# Patient Record
Sex: MALE | Race: WHITE | Employment: FULL TIME | URBAN - METROPOLITAN AREA
[De-identification: names, ages, dates, MRNs, and addresses within clinical notes are randomized per-mention and may not be internally consistent; named-entity substitution may affect disease eponyms.]

---

## 2018-09-19 ENCOUNTER — OFFICE VISIT (OUTPATIENT)
Dept: FAMILY MEDICINE | Facility: CLINIC | Age: 33
End: 2018-09-19
Payer: COMMERCIAL

## 2018-09-19 VITALS
TEMPERATURE: 98 F | SYSTOLIC BLOOD PRESSURE: 134 MMHG | HEIGHT: 68 IN | BODY MASS INDEX: 24.1 KG/M2 | HEART RATE: 77 BPM | WEIGHT: 159 LBS | OXYGEN SATURATION: 97 % | RESPIRATION RATE: 16 BRPM | DIASTOLIC BLOOD PRESSURE: 88 MMHG

## 2018-09-19 DIAGNOSIS — F41.1 GAD (GENERALIZED ANXIETY DISORDER): Primary | ICD-10-CM

## 2018-09-19 PROCEDURE — 99214 OFFICE O/P EST MOD 30 MIN: CPT | Performed by: NURSE PRACTITIONER

## 2018-09-19 ASSESSMENT — ENCOUNTER SYMPTOMS: NERVOUS/ANXIOUS: 1

## 2018-09-19 NOTE — PATIENT INSTRUCTIONS
Anxiety:  Start the zoloft/sertraline as prescribed.  Read the side effect profile that comes with the medication and let me know if you have any questions/problems.  Let me know if you have issues.  Otherwise follow up with me in 3 months (before your prescription runs out).  Consider therapy.      Understanding Anxiety Disorders  Almost everyone gets nervous now and then. It s normal to have knots in your stomach before a test, or for your heart to race on a first date. But an anxiety disorder is much more than a case of nerves. In fact, its symptoms may be overwhelming. But treatment can relieve many of these symptoms. Talking to your healthcare provider is the first step.    What are anxiety disorders?  An anxiety disorder causes intense feelings of panic and fear. These feelings may arise for no apparent reason. And they tend to recur again and again. They may prevent you from coping with life and cause you great distress. As a result, you may avoid anything that triggers your fear. In extreme cases, you may never leave the house. Anxiety disorders may cause other symptoms, such as:    Obsessive thoughts you can t control    Constant nightmares or painful thoughts of the past    Nausea, sweating, and muscle tension    Trouble sleeping or concentrating  What causes anxiety disorders?  Anxiety disorders tend to run in families. For some people, childhood abuse or neglect may play a role. For others, stressful life events or trauma may trigger anxiety disorders. Anxiety can trigger low self-esteem and poor coping skills.  Common anxiety disorders    Panic disorder. This causes an intense fear of being in danger.    Phobias. These are extreme fears of certain objects, places, or events.    Obsessive-compulsive disorder. This causes you to have unwanted thoughts and urges. You also may perform certain actions over and over.    Posttraumatic stress disorder. This occurs in people who have survived a terrible ordeal.  It can cause nightmares and flashbacks about the event.    Generalized anxiety disorder. This causes constant worry that can greatly disrupt your life.   Getting better  You may believe that nothing can help you. Or, you might fear what others may think. But most anxiety symptoms can be eased. Having an anxiety disorder is nothing to be ashamed of. Most people do best with treatment that combines medicine and therapy. These aren t cures. But they can help you live a healthier life.  Date Last Reviewed: 2/1/2017 2000-2017 [employer]. [address] [place] [address]. All rights reserved. This information is not intended as a substitute for professional medical care. Always follow your healthcare professional's instructions.

## 2018-09-19 NOTE — PROGRESS NOTES
"  SUBJECTIVE:   Ra Isabel is a 32 year old male who presents to clinic today for the following health issues:  Chief Complaint   Patient presents with     Anxiety         Anxiety     History of Present Illness     Depression & Anxiety Follow-up:     Depression/Anxiety:  Anxiety only    Status since last visit::  Worsened    Other associated symptoms of anxiety::  YES    Significant life event::  No    Current substance use::  Alcohol    Depression symptoms::  None       Today's PHQ-9         PHQ-9 Total Score:         PHQ-9 Q9 Suicidal ideation:       Thoughts of suicide or self harm:      Self-harm Plan:        Self-harm Action:          Safety concerns for self or others:            Ra reports \"I have always had anxiety and I have always had it pretty well controlled.\"  In 2007 he did take some diazepam intermittently for anxiety but he has not taken it since.  Since that time he has continued to have anxiety but he has done relaxation techniques, calming therapies, etc.  Mom has anxiety (uncertain if she is treated).  He is in the clinic today because his anxiety symptoms are escalating.  He has had some situations at home with his living situation where he and his wife live on the first level of his home.  A war  was living in the upper level of the home.  The warts that are known off of his psychiatric meds, \"went crazy\",   and divided homeless people to live in the home as well as on the front porch, and he has had some work stress.  Since that time, the war  has moved out.  He and his wife continue to have problems with street people on their front steps and home.  He is in the clinic today because his anxiety symptoms are getting worse.  He is not sleeping well.  He does not go to therapy, he does not think he needs it.    He is considering a medication today.  He is also considering medicinal marijuana.      Insomnia.  Getting worse now.    Working full time.   \"[employer].\"  Barista, customer service    From [facility]      Problem list and histories reviewed & adjusted, as indicated.  Additional history: as documented      Patient Active Problem List   Diagnosis     Snoring     LAVINIA (generalized anxiety disorder)     Past Surgical History:   Procedure Laterality Date     NO HISTORY OF SURGERY         Social History   Substance Use Topics     Smoking status: Former Smoker     Smokeless tobacco: Never Used     Alcohol use Yes      Comment: 1-2 times a week      Family History   Problem Relation Age of Onset     No Known Problems Mother      No Known Problems Father          Current Outpatient Prescriptions   Medication Sig Dispense Refill     sertraline (ZOLOFT) 50 MG tablet Take 1/2 tablet (25 mg) for 1-2 weeks, then increase to 1 tablet orally daily 90 tablet 0     Allergies   Allergen Reactions     Azithromycin      Recent Labs   Lab Test  04/13/16   1325   LDL  91   HDL  34*   TRIG  112      BP Readings from Last 3 Encounters:   09/19/18 134/88   04/13/16 124/80    Wt Readings from Last 3 Encounters:   09/19/18 159 lb (72.1 kg)   04/13/16 153 lb 12.8 oz (69.8 kg)                  Labs reviewed in EPIC    ROS:  Constitutional, HEENT, cardiovascular, pulmonary, GI, , musculoskeletal, neuro, skin, endocrine and psych systems are negative, except as otherwise noted.    OBJECTIVE:     /88  Pulse 77  Temp 98  F (36.7  C) (Oral)  Resp 16  Ht 5' 7.5\" (1.715 m)  Wt 159 lb (72.1 kg)  SpO2 97%  BMI 24.54 kg/m2  Body mass index is 24.54 kg/(m^2).  GENERAL APPEARANCE: healthy, alert and no distress. Smiling.   SKIN: warm and dry  PSYCH: mentation appears normal and affect normal/bright.  Good eye contact.  He appears to be in good spirits, offering information freely, smiling etc.    ASSESSMENT/PLAN:     (F41.1) LAVINIA (generalized anxiety disorder)  (primary encounter diagnosis)  Comment: Chronic/worse  Plan: sertraline (ZOLOFT) 50 MG tablet          Reviewed different " anxiety medications, their indications and reasons for use. Will start at a low dose of zoloft and anticipate that we may need to titrate up. Instructed pt to return to the clinic in3 month for a med recheck/refills, sooner if having issues or concerns.   I did tell him today that I would not prescribe benzodiazepines for him.  Since his anxiety is chronic and getting worse, he would do much better on a daily medication that will bring down his anxiety rather than take medication intermittently that would cause him to be sedated.  He agrees and understands.  I also told him that I do not prescribe and do not have the ability to prescribe medicinal marijuana.  He possibly will search had a provider who will do that or he will look into his options for medicinal marijuana.  He was appreciative the information.    Patient Instructions     Anxiety:  Start the zoloft/sertraline as prescribed.  Read the side effect profile that comes with the medication and let me know if you have any questions/problems.  Let me know if you have issues.  Otherwise follow up with me in 3 months (before your prescription runs out).  Consider therapy.      Understanding Anxiety Disorders  Almost everyone gets nervous now and then. It s normal to have knots in your stomach before a test, or for your heart to race on a first date. But an anxiety disorder is much more than a case of nerves. In fact, its symptoms may be overwhelming. But treatment can relieve many of these symptoms. Talking to your healthcare provider is the first step.    What are anxiety disorders?  An anxiety disorder causes intense feelings of panic and fear. These feelings may arise for no apparent reason. And they tend to recur again and again. They may prevent you from coping with life and cause you great distress. As a result, you may avoid anything that triggers your fear. In extreme cases, you may never leave the house. Anxiety disorders may cause other symptoms, such  as:    Obsessive thoughts you can t control    Constant nightmares or painful thoughts of the past    Nausea, sweating, and muscle tension    Trouble sleeping or concentrating  What causes anxiety disorders?  Anxiety disorders tend to run in families. For some people, childhood abuse or neglect may play a role. For others, stressful life events or trauma may trigger anxiety disorders. Anxiety can trigger low self-esteem and poor coping skills.  Common anxiety disorders    Panic disorder. This causes an intense fear of being in danger.    Phobias. These are extreme fears of certain objects, places, or events.    Obsessive-compulsive disorder. This causes you to have unwanted thoughts and urges. You also may perform certain actions over and over.    Posttraumatic stress disorder. This occurs in people who have survived a terrible ordeal. It can cause nightmares and flashbacks about the event.    Generalized anxiety disorder. This causes constant worry that can greatly disrupt your life.   Getting better  You may believe that nothing can help you. Or, you might fear what others may think. But most anxiety symptoms can be eased. Having an anxiety disorder is nothing to be ashamed of. Most people do best with treatment that combines medicine and therapy. These aren t cures. But they can help you live a healthier life.  Date Last Reviewed: 2/1/2017 2000-2017 [employer]. [address] [place] [address], [place] [address]. All rights reserved. This information is not intended as a substitute for professional medical care. Always follow your healthcare professional's instructions.            I spent a total of 30 min with the patient, >50% time spent face to face counseling regarding the above issues, establishing a plan of care, and coordinating follow up care.       ROSSY Chua [facility]

## 2018-09-19 NOTE — MR AVS SNAPSHOT
After Visit Summary   9/19/2018    Ra Isabel    MRN: [id number]           Patient Information     Date Of Birth          1985        Visit Information        Provider Department      9/19/2018 4:20 PM Tricia Mauricio APRN [facility]        Today's Diagnoses     LAVINIA (generalized anxiety disorder)    -  1      Care Instructions    Anxiety:  Start the zoloft/sertraline as prescribed.  Read the side effect profile that comes with the medication and let me know if you have any questions/problems.  Let me know if you have issues.  Otherwise follow up with me in 3 months (before your prescription runs out).  Consider therapy.      Understanding Anxiety Disorders  Almost everyone gets nervous now and then. It s normal to have knots in your stomach before a test, or for your heart to race on a first date. But an anxiety disorder is much more than a case of nerves. In fact, its symptoms may be overwhelming. But treatment can relieve many of these symptoms. Talking to your healthcare provider is the first step.    What are anxiety disorders?  An anxiety disorder causes intense feelings of panic and fear. These feelings may arise for no apparent reason. And they tend to recur again and again. They may prevent you from coping with life and cause you great distress. As a result, you may avoid anything that triggers your fear. In extreme cases, you may never leave the house. Anxiety disorders may cause other symptoms, such as:    Obsessive thoughts you can t control    Constant nightmares or painful thoughts of the past    Nausea, sweating, and muscle tension    Trouble sleeping or concentrating  What causes anxiety disorders?  Anxiety disorders tend to run in families. For some people, childhood abuse or neglect may play a role. For others, stressful life events or trauma may trigger anxiety disorders. Anxiety can trigger low self-esteem and poor coping skills.  Common  anxiety disorders    Panic disorder. This causes an intense fear of being in danger.    Phobias. These are extreme fears of certain objects, places, or events.    Obsessive-compulsive disorder. This causes you to have unwanted thoughts and urges. You also may perform certain actions over and over.    Posttraumatic stress disorder. This occurs in people who have survived a terrible ordeal. It can cause nightmares and flashbacks about the event.    Generalized anxiety disorder. This causes constant worry that can greatly disrupt your life.   Getting better  You may believe that nothing can help you. Or, you might fear what others may think. But most anxiety symptoms can be eased. Having an anxiety disorder is nothing to be ashamed of. Most people do best with treatment that combines medicine and therapy. These aren t cures. But they can help you live a healthier life.  Date Last Reviewed: 2/1/2017 2000-2017 [employer]. [address] [place] [address]. All rights reserved. This information is not intended as a substitute for professional medical care. Always follow your healthcare professional's instructions.                Follow-ups after your visit        Who to contact     If you have questions or need follow up information about today's clinic visit or your schedule please contact [facility] directly at [phone].  Normal or non-critical lab and imaging results will be communicated to you by MyChart, letter or phone within 4 business days after the clinic has received the results. If you do not hear from us within 7 days, please contact the clinic through [employer] or phone. If you have a critical or abnormal lab result, we will notify you by phone as soon as possible.  Submit refill requests through [employer] or call your pharmacy and they will forward the refill request to us. Please allow 3 business days for your refill to be completed.          Additional  "Information About Your Visit        MyChart Information     [employer] gives you secure access to your electronic health record. If you see a primary care provider, you can also send messages to your care team and make appointments. If you have questions, please call your primary care clinic.  If you do not have a primary care provider, please call [phone] and they will assist you.        Care EveryWhere ID     This is your Care EveryWhere ID. This could be used by other organizations to access your [place] medical records  [id number]        Your Vitals Were     Pulse Temperature Respirations Height Pulse Oximetry BMI (Body Mass Index)    77 98  F (36.7  C) (Oral) 16 5' 7.5\" (1.715 m) 97% 24.54 kg/m2       Blood Pressure from Last 3 Encounters:   09/19/18 134/88   04/13/16 124/80    Weight from Last 3 Encounters:   09/19/18 159 lb (72.1 kg)   04/13/16 153 lb 12.8 oz (69.8 kg)              Today, you had the following     No orders found for display         Today's Medication Changes          These changes are accurate as of 9/19/18  4:58 PM.  If you have any questions, ask your nurse or doctor.               Start taking these medicines.        Dose/Directions    sertraline 50 MG tablet   Commonly known as:  ZOLOFT   Used for:  LAVINIA (generalized anxiety disorder)   Started by:  Tricia Mauricio APRN CNP        Take 1/2 tablet (25 mg) for 1-2 weeks, then increase to 1 tablet orally daily   Quantity:  90 tablet   Refills:  0            Where to get your medicines      [id number] - [place], [place] - [address]  [address], [place] [address]     Phone:  [phone]     sertraline 50 MG tablet                Primary Care Provider Office Phone # Fax #    ROSSY Tran [phone] [address] [place] [address]        Equal Access to Services     FRANCISCO ALAS AH: Dee scott " niranjan Shepherd, melquiadesda luserafinadaha, qapedrota kafrancis guthrie, spencer domínguez margauxashly dickson lathuytrenton amadeo. So [facility] [phone].    ATENCIÓN: Si habla español, tiene a tilley disposición servicios gratuitos de asistencia lingüística. Miriam al [phone].    We comply with applicable federal civil rights laws and [place] laws. We do not discriminate on the basis of race, color, national origin, age, disability, sex, sexual orientation, or gender identity.            Thank you!     Thank you for choosing [facility]  for your care. Our goal is always to provide you with excellent care. Hearing back from our patients is one way we can continue to improve our services. Please take a few minutes to complete the written survey that you may receive in the mail after your visit with us. Thank you!             Your Updated Medication List - Protect others around you: Learn how to safely use, store and throw away your medicines at www.disposemymeds.org.          This list is accurate as of 9/19/18  4:58 PM.  Always use your most recent med list.                   Brand Name Dispense Instructions for use Diagnosis    sertraline 50 MG tablet    ZOLOFT    90 tablet    Take 1/2 tablet (25 mg) for 1-2 weeks, then increase to 1 tablet orally daily    LAVINIA (generalized anxiety disorder)

## 2018-11-28 ENCOUNTER — OFFICE VISIT (OUTPATIENT)
Dept: FAMILY MEDICINE | Facility: CLINIC | Age: 33
End: 2018-11-28
Payer: COMMERCIAL

## 2018-11-28 VITALS
OXYGEN SATURATION: 96 % | RESPIRATION RATE: 16 BRPM | DIASTOLIC BLOOD PRESSURE: 76 MMHG | BODY MASS INDEX: 22.88 KG/M2 | SYSTOLIC BLOOD PRESSURE: 124 MMHG | WEIGHT: 151 LBS | HEIGHT: 68 IN | HEART RATE: 55 BPM

## 2018-11-28 DIAGNOSIS — F41.1 GAD (GENERALIZED ANXIETY DISORDER): ICD-10-CM

## 2018-11-28 PROCEDURE — 99213 OFFICE O/P EST LOW 20 MIN: CPT | Performed by: NURSE PRACTITIONER

## 2018-11-28 ASSESSMENT — ANXIETY QUESTIONNAIRES
6. BECOMING EASILY ANNOYED OR IRRITABLE: NOT AT ALL
3. WORRYING TOO MUCH ABOUT DIFFERENT THINGS: SEVERAL DAYS
7. FEELING AFRAID AS IF SOMETHING AWFUL MIGHT HAPPEN: NOT AT ALL
2. NOT BEING ABLE TO STOP OR CONTROL WORRYING: NOT AT ALL
IF YOU CHECKED OFF ANY PROBLEMS ON THIS QUESTIONNAIRE, HOW DIFFICULT HAVE THESE PROBLEMS MADE IT FOR YOU TO DO YOUR WORK, TAKE CARE OF THINGS AT HOME, OR GET ALONG WITH OTHER PEOPLE: SOMEWHAT DIFFICULT
1. FEELING NERVOUS, ANXIOUS, OR ON EDGE: SEVERAL DAYS
GAD7 TOTAL SCORE: 6
5. BEING SO RESTLESS THAT IT IS HARD TO SIT STILL: MORE THAN HALF THE DAYS

## 2018-11-28 ASSESSMENT — PATIENT HEALTH QUESTIONNAIRE - PHQ9
SUM OF ALL RESPONSES TO PHQ QUESTIONS 1-9: 4
5. POOR APPETITE OR OVEREATING: MORE THAN HALF THE DAYS

## 2018-11-28 NOTE — PROGRESS NOTES
"  SUBJECTIVE:   Ra Isabel is a 33 year old male who presents to clinic today for the following health issues:  Chief Complaint   Patient presents with     Anxiety       History of Present Illness     Depression & Anxiety Follow-up:     Depression/Anxiety:  Anxiety only    Status since last visit::  Improved    Other associated symptoms of anxiety::  None    Significant life event::  No    Current substance use::  Alcohol    Depression symptoms::  None       Today's PHQ-9         PHQ-9 Total Score:         PHQ-9 Q9 Suicidal ideation:       Thoughts of suicide or self harm:      Self-harm Plan:        Self-harm Action:          Safety concerns for self or others:            Ra was in the clinic September 2018 with complaints of anxiety.  He was started on sertraline.  The first 2 weeks of sertraline he \"had every side effect possible.\"  He kept taking the sertraline and within 2 weeks the side effects were gone.  Today he is feeling much better.  His anxiety is gone.  He has much more energy, \"sometimes too much energy.\"  Eating healthy.  No lingering side effects.    No new issues to discuss today.     Problem list and histories reviewed & adjusted, as indicated.  Additional history: as documented        Patient Active Problem List   Diagnosis     Snoring     LAVINIA (generalized anxiety disorder)     Past Surgical History:   Procedure Laterality Date     NO HISTORY OF SURGERY         Social History   Substance Use Topics     Smoking status: Former Smoker     Smokeless tobacco: Never Used     Alcohol use Yes      Comment: 1-2 times a week      Family History   Problem Relation Age of Onset     No Known Problems Mother      No Known Problems Father          Current Outpatient Prescriptions   Medication Sig Dispense Refill     sertraline (ZOLOFT) 50 MG tablet Take 1/2 tablet (25 mg) for 1-2 weeks, then increase to 1 tablet orally daily 90 tablet 0     Allergies   Allergen Reactions     Azithromycin      Recent " "Labs   Lab Test  04/13/16   1325   LDL  91   HDL  34*   TRIG  112      BP Readings from Last 3 Encounters:   11/28/18 124/76   09/19/18 134/88   04/13/16 124/80    Wt Readings from Last 3 Encounters:   11/28/18 151 lb (68.5 kg)   09/19/18 159 lb (72.1 kg)   04/13/16 153 lb 12.8 oz (69.8 kg)              Labs reviewed in EPIC    ROS:  Constitutional, HEENT, cardiovascular, pulmonary, GI, , musculoskeletal, neuro, skin, endocrine and psych systems are negative, except as otherwise noted.    OBJECTIVE:     /76  Pulse 55  Resp 16  Ht 5' 7.5\" (1.715 m)  Wt 151 lb (68.5 kg)  SpO2 96%  BMI 23.3 kg/m2  Body mass index is 23.3 kg/(m^2).  GENERAL APPEARANCE: healthy, alert and no distress. Smiling.   SKIN: warm and dry  PSYCH: mentation appears normal and affect normal/bright.  Good eye contact.      ASSESSMENT/PLAN:     (F41.1) LAVINIA (generalized anxiety disorder)  Comment: improved  Plan: sertraline (ZOLOFT) 50 MG tablet        I discussed with the patient that I will keep him on the sertraline at 50mg per day.  I encouraged him to continue healthy self cares and to take good care of himself.  He is planning to increase his aerobic activity/scheduled exercise.  He is to return to the clinic to see me in 6 months for refills and re-evaluation, sooner if problems.         ROSSY Chua [facility]  "

## 2018-11-28 NOTE — MR AVS SNAPSHOT
"              After Visit Summary   11/28/2018    Ra Isabel    MRN: [id number]           Patient Information     Date Of Birth          1985        Visit Information        Provider Department      11/28/2018 3:20 PM Tricia Mauricio APRN CNP [facility]        Today's Diagnoses     LAVINIA (generalized anxiety disorder)           Follow-ups after your visit        Follow-up notes from your care team     Return in about 6 months (around 5/28/2019).      Who to contact     If you have questions or need follow up information about today's clinic visit or your schedule please contact [facility] directly at [phone].  Normal or non-critical lab and imaging results will be communicated to you by MyChart, letter or phone within 4 business days after the clinic has received the results. If you do not hear from us within 7 days, please contact the clinic through 911 Viewhart or phone. If you have a critical or abnormal lab result, we will notify you by phone as soon as possible.  Submit refill requests through Ubook[employer] or call your pharmacy and they will forward the refill request to us. Please allow 3 business days for your refill to be completed.          Additional Information About Your Visit        MyChart Information     U[employer] gives you secure access to your electronic health record. If you see a primary care provider, you can also send messages to your care team and make appointments. If you have questions, please call your primary care clinic.  If you do not have a primary care provider, please call [phone] and they will assist you.        Care EveryWhere ID     This is your Care EveryWhere ID. This could be used by other organizations to access your [facility] medical records  [id number]        Your Vitals Were     Pulse Respirations Height Pulse Oximetry BMI (Body Mass Index)       55 16 5' 7.5\" (1.715 m) 96% 23.3 kg/m2        Blood Pressure from Last 3 " Encounters:   11/28/18 124/76   09/19/18 134/88   04/13/16 124/80    Weight from Last 3 Encounters:   11/28/18 151 lb (68.5 kg)   09/19/18 159 lb (72.1 kg)   04/13/16 153 lb 12.8 oz (69.8 kg)              Today, you had the following     No orders found for display         Today's Medication Changes          These changes are accurate as of 11/28/18  6:20 PM.  If you have any questions, ask your nurse or doctor.               These medicines have changed or have updated prescriptions.        Dose/Directions    sertraline 50 MG tablet   Commonly known as:  ZOLOFT   This may have changed:    - how much to take  - how to take this  - when to take this  - additional instructions   Used for:  LAVINIA (generalized anxiety disorder)   Changed by:  Tricia Mauricio APRN CNP        Dose:  50 mg   Take 1 tablet (50 mg) by mouth daily   Quantity:  90 tablet   Refills:  3            Where to get your medicines      These medications were sent to [id number] - [place], [place] - [address] AT [facility]  [address], [place] [address]     Phone:  [phone]     sertraline 50 MG tablet                Primary Care Provider Office Phone # Fax #    ROSSY Tran [phone] [address] [place] [address]        Equal Access to Services     FRANCISCO ALAS AH: Hadii gregoria murilloo Soabelardo, waaxda luqadaha, qaybta kaalmada adeegyada, spencer childs. So [facility] [phone].    ATENCIÓN: Si habla español, tiene a tilley disposición servicios gratuitos de asistencia lingüística. Llame al [phone].    We comply with applicable federal civil rights laws and [place] laws. We do not discriminate on the basis of race, color, national origin, age, disability, sex, sexual orientation, or gender identity.            Thank you!     Thank you for choosing [facility]  for your care. Our goal is always to provide  you with excellent care. Hearing back from our patients is one way we can continue to improve our services. Please take a few minutes to complete the written survey that you may receive in the mail after your visit with us. Thank you!             Your Updated Medication List - Protect others around you: Learn how to safely use, store and throw away your medicines at www.disposemymeds.org.          This list is accurate as of 11/28/18  6:20 PM.  Always use your most recent med list.                   Brand Name Dispense Instructions for use Diagnosis    sertraline 50 MG tablet    ZOLOFT    90 tablet    Take 1 tablet (50 mg) by mouth daily    LAVINIA (generalized anxiety disorder)

## 2018-11-29 ASSESSMENT — ANXIETY QUESTIONNAIRES: GAD7 TOTAL SCORE: 6

## 2019-11-03 ENCOUNTER — HEALTH MAINTENANCE LETTER (OUTPATIENT)
Age: 34
End: 2019-11-03

## 2019-11-10 DIAGNOSIS — F41.1 GAD (GENERALIZED ANXIETY DISORDER): ICD-10-CM

## 2019-11-10 NOTE — TELEPHONE ENCOUNTER
"sertraline (ZOLOFT) 50 MG tablet   Last Written Prescription Date:  11/28/2018  Last Fill Quantity: 90 TABLET,  # refills: 3   Last office visit: 11/28/2018 with prescribing provider:  ANGELA BUENO   Future Office Visit:      Requested Prescriptions   Pending Prescriptions Disp Refills     sertraline (ZOLOFT) 50 MG tablet [Pharmacy Med Name: SERTRALINE 50MG TABLETS] 90 tablet 0     Sig: TAKE 1/2 TABLET BY MOUTH DAILY FOR 1-2 WEEKS, THEN INCREASE TO 1 TABLET DAILY       SSRIs Protocol Passed - 11/10/2019  3:42 AM        Passed - Recent (12 mo) or future (30 days) visit within the authorizing provider's specialty     Patient has had an office visit with the authorizing provider or a provider within the authorizing providers department within the previous 12 mos or has a future within next 30 days. See \"Patient Info\" tab in inbasket, or \"Choose Columns\" in Meds & Orders section of the refill encounter.              Passed - Medication is active on med list        Passed - Patient is age 18 or older        PHQ-9 SCORE 11/28/2018   PHQ-9 Total Score 4       "

## 2019-11-10 NOTE — LETTER
[facility]  [address] [place] [address]  Phone: [phone]    11/12/19    Ra Isabel  [address]  [place] [address]      To whom it may concern:     In order to ensure we are providing the best quality care, we have reviewed your chart and see that you are due for a follow-up appointment.    We have also sent your sertraline (ZOLOFT) 50 MG tablet medication to your pharmacy. For future medication refills, please contact the clinic to schedule the above appointment. This can be requested via [employer] or by calling the clinic at [phone].    We greatly appreciate the opportunity to serve you. Thank you for trusting us with your health care.      Sincerely,    Your care team at [facility]

## 2019-11-12 NOTE — TELEPHONE ENCOUNTER
LM informing patient that RX was refilled. Patient is due for a follow-up appt prior to additional refills given. Sending letter.    Ana Becker

## 2019-11-12 NOTE — TELEPHONE ENCOUNTER
Team Coordinators: please contact patient, due for appointment in the clinic for follow up    Medication is being filled for 1 time refill only due to:  Patient needs to be seen because medication follow up.     Thank You!  Kasey Sprague RN  Triage Nurse

## 2019-12-07 DIAGNOSIS — F41.1 GAD (GENERALIZED ANXIETY DISORDER): ICD-10-CM

## 2019-12-07 NOTE — LETTER
[facility]  [address] [place] [address]  Phone: [phone]    12/12/19    Ra Isabel  [address]  [place] [address]      To whom it may concern:     In order to ensure we are providing the best quality care, we have reviewed your chart and see   that you are due for : a follow-up appointment for further refills on sertraline (ZOLOFT) 50 MG tablet.        Please call the clinic at [phone] at your earliest convenience to schedule an appointment.  We greatly appreciate the opportunity to serve you . Thank you for trusting us with your health care.    Your [facility] Healthcare Team

## 2019-12-08 NOTE — TELEPHONE ENCOUNTER
"sertraline (ZOLOFT) 50 MG tablet   Last Written Prescription Date:  11/12/2019  Last Fill Quantity: 30 TABLET,  # refills: 0   Last office visit: 11/28/2018 with prescribing provider:  ANGELA BUENO   Future Office Visit:      Requested Prescriptions   Pending Prescriptions Disp Refills     sertraline (ZOLOFT) 50 MG tablet [Pharmacy Med Name: SERTRALINE 50MG TABLETS] 90 tablet 0     Sig: TAKE 1 TABLET(50 MG) BY MOUTH DAILY       SSRIs Protocol Failed - 12/7/2019  3:49 PM        Failed - Recent (12 mo) or future (30 days) visit within the authorizing provider's specialty     Patient has had an office visit with the authorizing provider or a provider within the authorizing providers department within the previous 12 mos or has a future within next 30 days. See \"Patient Info\" tab in inbasket, or \"Choose Columns\" in Meds & Orders section of the refill encounter.              Passed - Medication is active on med list        Passed - Patient is age 18 or older          "

## 2019-12-11 NOTE — TELEPHONE ENCOUNTER
LM to return clinic phone call. Patient is due for follow up visit.  MyChart msg sent.         Shanda LANDAVERDE     [facility]

## 2019-12-11 NOTE — TELEPHONE ENCOUNTER
Routing refill request to provider for review/approval because:  Steffanie given x1 and patient did not follow up, please advise    HP Reception - please ask patient to schedule office visit for future refills

## 2020-01-11 DIAGNOSIS — F41.1 GAD (GENERALIZED ANXIETY DISORDER): ICD-10-CM

## 2020-01-11 NOTE — TELEPHONE ENCOUNTER
"sertraline (ZOLOFT) 50 MG tablet   Last Written Prescription Date:  11/12/2019  Last Fill Quantity: 30 TABLET,  # refills: 0   Last office visit: 11/28/2018 with prescribing provider:  ANGELA BUENO   Future Office Visit:      Requested Prescriptions   Pending Prescriptions Disp Refills     sertraline (ZOLOFT) 50 MG tablet [Pharmacy Med Name: SERTRALINE 50MG TABLETS] 30 tablet 0     Sig: SEE NOTES       SSRIs Protocol Failed - 1/11/2020  4:19 PM        Failed - Recent (12 mo) or future (30 days) visit within the authorizing provider's specialty     Patient has had an office visit with the authorizing provider or a provider within the authorizing providers department within the previous 12 mos or has a future within next 30 days. See \"Patient Info\" tab in inbasket, or \"Choose Columns\" in Meds & Orders section of the refill encounter.              Passed - Medication is active on med list        Passed - Patient is age 18 or older        PHQ-9 SCORE 11/28/2018   PHQ-9 Total Score 4     "

## 2020-03-06 ENCOUNTER — E-VISIT (OUTPATIENT)
Dept: FAMILY MEDICINE | Facility: CLINIC | Age: 35
End: 2020-03-06

## 2020-03-06 DIAGNOSIS — F41.1 GAD (GENERALIZED ANXIETY DISORDER): ICD-10-CM

## 2020-03-06 PROCEDURE — 99207 ZZC NO BILLABLE SERVICE THIS VISIT: CPT | Performed by: NURSE PRACTITIONER

## 2020-03-06 ASSESSMENT — ANXIETY QUESTIONNAIRES
5. BEING SO RESTLESS THAT IT IS HARD TO SIT STILL: NEARLY EVERY DAY
1. FEELING NERVOUS, ANXIOUS, OR ON EDGE: SEVERAL DAYS
7. FEELING AFRAID AS IF SOMETHING AWFUL MIGHT HAPPEN: NOT AT ALL
4. TROUBLE RELAXING: NEARLY EVERY DAY
7. FEELING AFRAID AS IF SOMETHING AWFUL MIGHT HAPPEN: NOT AT ALL
GAD7 TOTAL SCORE: 9
GAD7 TOTAL SCORE: 9
2. NOT BEING ABLE TO STOP OR CONTROL WORRYING: SEVERAL DAYS
3. WORRYING TOO MUCH ABOUT DIFFERENT THINGS: SEVERAL DAYS
6. BECOMING EASILY ANNOYED OR IRRITABLE: NOT AT ALL

## 2020-03-07 ASSESSMENT — ANXIETY QUESTIONNAIRES: GAD7 TOTAL SCORE: 9

## 2020-09-05 DIAGNOSIS — F41.1 GAD (GENERALIZED ANXIETY DISORDER): ICD-10-CM

## 2020-09-08 NOTE — TELEPHONE ENCOUNTER
Video or face to face appointment now for refills.  Does he need a small supply of sertraline to get him through to that appointment?

## 2020-09-09 NOTE — TELEPHONE ENCOUNTER
Medication is being filled for 1 time refill only due to:  Patient needs to be seen because Depression check. Can be virtual. Please call and asssit with an appointment. .

## 2020-11-16 ENCOUNTER — HEALTH MAINTENANCE LETTER (OUTPATIENT)
Age: 35
End: 2020-11-16

## 2021-01-05 DIAGNOSIS — F41.1 GAD (GENERALIZED ANXIETY DISORDER): ICD-10-CM

## 2021-01-06 NOTE — TELEPHONE ENCOUNTER
Already given sumit refill. Please call and assist with an appointment. Can refill to cover once an appointment has been made. Alexus Alston RN

## 2021-01-14 ENCOUNTER — VIRTUAL VISIT (OUTPATIENT)
Dept: FAMILY MEDICINE | Facility: CLINIC | Age: 36
End: 2021-01-14

## 2021-01-14 DIAGNOSIS — F41.1 GAD (GENERALIZED ANXIETY DISORDER): ICD-10-CM

## 2021-01-14 PROCEDURE — 99213 OFFICE O/P EST LOW 20 MIN: CPT | Mod: 95 | Performed by: NURSE PRACTITIONER

## 2021-01-14 NOTE — PROGRESS NOTES
Ra is a 35 year old who is being evaluated via a billable video visit.      How would you like to obtain your AVS? MyChart  If the video visit is dropped, the invitation should be resent by: Text to cell phone: [phone]   Will anyone else be joining your video visit? No    Video Start Time: 4:57 PM  Assessment & Plan     (F41.1) LAVINIA (generalized anxiety disorder)  Comment: improved  Plan: sertraline (ZOLOFT) 50 MG tablet        Refills given.  Continue healthy self cares.  Yearly recheck/refills with me, sooner prn.     Adacel needed---he will be seen in the pharmacy for his vaccine.  Covid 19 vaccine recommended when it is available.          Return in about 1 year (around 1/14/2022) for Medication follow up.    ROSSY Chua [facility]    Katie Knapp is a 35 year old who presents to clinic today for the following health issues     HPI     Chief Complaint   Patient presents with     Anxiety     sertraline renew     Ra has been on sertraline since September 2018.  It has worked very well and he has no side effects.  He is not in therapy and he does not feel like he needs it.  Today he is asking for refills on his sertraline.  He is feeling great.     Work:  Retail.        Review of Systems   Constitutional, HEENT, cardiovascular, pulmonary, GI, , musculoskeletal, neuro, skin, endocrine and psych systems are negative, except as otherwise noted.      Objective           Vitals:  No vitals were obtained today due to virtual visit.    Physical Exam   GENERAL: Healthy, alert and no distress  EYES: Eyes grossly normal to inspection.  No discharge or erythema, or obvious scleral/conjunctival abnormalities.  RESP: No audible wheeze, cough, or visible cyanosis.  No visible retractions or increased work of breathing.    SKIN: Visible skin clear. No significant rash, abnormal pigmentation or lesions.  NEURO: Cranial nerves grossly intact.  Mentation  and speech appropriate for age.  PSYCH: Mentation appears normal, affect normal/bright, judgement and insight intact, normal speech and appearance well-groomed.            Video-Visit Details    Type of service:  Video Visit    Video End Time:5:07 PM    Originating Location (pt. Location): Home    Distant Location (provider location):  [facility]     Platform used for Video Visit: Cytoguide

## 2021-09-18 ENCOUNTER — HEALTH MAINTENANCE LETTER (OUTPATIENT)
Age: 36
End: 2021-09-18

## 2021-11-04 DIAGNOSIS — F41.1 GAD (GENERALIZED ANXIETY DISORDER): ICD-10-CM

## 2021-11-05 NOTE — TELEPHONE ENCOUNTER
1 refill approved needs visit to establish care with new provider, please call and schedule.    Thank you

## 2022-01-08 ENCOUNTER — HEALTH MAINTENANCE LETTER (OUTPATIENT)
Age: 37
End: 2022-01-08

## 2022-02-10 DIAGNOSIS — F41.1 GAD (GENERALIZED ANXIETY DISORDER): ICD-10-CM

## 2022-02-10 NOTE — TELEPHONE ENCOUNTER
Routing refill request to provider for review/approval because:  --Last order sent was sumit with reminder needs to establish with new provider  --Now due for annual also.  --voice mail reminder left for patient 11/9/21.            --Last visit:  1/14/2021 Hang      --Future Visit: NONE.

## 2022-03-21 DIAGNOSIS — F41.1 GAD (GENERALIZED ANXIETY DISORDER): ICD-10-CM

## 2022-03-22 NOTE — TELEPHONE ENCOUNTER
Providers-Please review and sign if agree.  Steffanie refill given x 2 and patient did not follow up.  14 day supply pended.    Team Coordinators-Please contact patient to schedule visit for anxiety follow up/establish care with a new PCP.    Last Written Prescription Date:  2/10/22  Last Fill Quantity: 30,  # refills: 0   Last office visit: 1/14/21 virtual visit provider:  AYSE Mauricio CNP   Future Office Visit:  None    Thank you!  RL CoxN, RN  [facility]

## 2022-03-25 NOTE — PROGRESS NOTES
Ra is a 36 year old who is being evaluated via a billable video visit.      How would you like to obtain your AVS? MyChart  If the video visit is dropped, the invitation should be resent by: Text to cell phone: [phone]   Will anyone else be joining your video visit? No      Video Start Time: 1:09 PM    Assessment & Plan     Establishing care with new doctor, encounter for    LAVINIA (generalized anxiety disorder)  -Stable, continue sertraline  -Follow-up for preventive health visit  - sertraline (ZOLOFT) 50 MG tablet  Dispense: 90 tablet; Refill: 3      Tom Joseph DO  [facility] [place]    Subjective   Ra is a 36 year old who presents for the following health issues     HPI   New patient to me. Would like to establish care and get refills on his sertraline.  Works [employer].  Takes sertraline for anxiety. Has been on current dosage for many years. Working well for him. Not currently seeing a therapist.   Practices mediation and has developed good coping mechanisms.       LAVINIA-7 SCORE 11/28/2018 3/6/2020 3/28/2022   Total Score - 9 (mild anxiety) 3 (minimal anxiety)   Total Score 6 9 3     PHQ 11/28/2018 3/28/2022   PHQ-9 Total Score 4 4   Q9: Thoughts of better off dead/self-harm past 2 weeks Not at all Not at all     Review of Systems         Objective         Vitals:  No vitals were obtained today due to virtual visit.    Physical Exam   GENERAL: Healthy, alert and no distress  EYES: Eyes grossly normal to inspection.  No discharge or erythema, or obvious scleral/conjunctival abnormalities.  RESP: No audible wheeze, cough, or visible cyanosis.  No visible retractions or increased work of breathing.    SKIN: Visible skin clear. No significant rash, abnormal pigmentation or lesions.  NEURO: Cranial nerves grossly intact.  Mentation and speech appropriate for age.  PSYCH: Mentation appears normal, affect normal/bright, judgement and insight intact, normal speech and appearance  well-groomed.        Video-Visit Details    Type of service:  Video Visit    Video End Time:1:09 PM    Originating Location (pt. Location): Home    Distant Location (provider location):  [facility]     Platform used for Video Visit: Zando

## 2022-03-28 ENCOUNTER — VIRTUAL VISIT (OUTPATIENT)
Dept: FAMILY MEDICINE | Facility: CLINIC | Age: 37
End: 2022-03-28

## 2022-03-28 VITALS — HEIGHT: 68 IN | BODY MASS INDEX: 22.73 KG/M2 | WEIGHT: 150 LBS

## 2022-03-28 DIAGNOSIS — Z76.89 ESTABLISHING CARE WITH NEW DOCTOR, ENCOUNTER FOR: Primary | ICD-10-CM

## 2022-03-28 DIAGNOSIS — F41.1 GAD (GENERALIZED ANXIETY DISORDER): ICD-10-CM

## 2022-03-28 PROCEDURE — 99213 OFFICE O/P EST LOW 20 MIN: CPT | Mod: 95 | Performed by: FAMILY MEDICINE

## 2022-03-28 ASSESSMENT — ANXIETY QUESTIONNAIRES
7. FEELING AFRAID AS IF SOMETHING AWFUL MIGHT HAPPEN: NOT AT ALL
GAD7 TOTAL SCORE: 3
6. BECOMING EASILY ANNOYED OR IRRITABLE: NOT AT ALL
1. FEELING NERVOUS, ANXIOUS, OR ON EDGE: NOT AT ALL
GAD7 TOTAL SCORE: 3
5. BEING SO RESTLESS THAT IT IS HARD TO SIT STILL: SEVERAL DAYS
4. TROUBLE RELAXING: MORE THAN HALF THE DAYS
GAD7 TOTAL SCORE: 3
7. FEELING AFRAID AS IF SOMETHING AWFUL MIGHT HAPPEN: NOT AT ALL
3. WORRYING TOO MUCH ABOUT DIFFERENT THINGS: NOT AT ALL
2. NOT BEING ABLE TO STOP OR CONTROL WORRYING: NOT AT ALL

## 2022-03-28 ASSESSMENT — PATIENT HEALTH QUESTIONNAIRE - PHQ9
SUM OF ALL RESPONSES TO PHQ QUESTIONS 1-9: 4
SUM OF ALL RESPONSES TO PHQ QUESTIONS 1-9: 4
10. IF YOU CHECKED OFF ANY PROBLEMS, HOW DIFFICULT HAVE THESE PROBLEMS MADE IT FOR YOU TO DO YOUR WORK, TAKE CARE OF THINGS AT HOME, OR GET ALONG WITH OTHER PEOPLE: NOT DIFFICULT AT ALL

## 2022-03-29 ASSESSMENT — PATIENT HEALTH QUESTIONNAIRE - PHQ9: SUM OF ALL RESPONSES TO PHQ QUESTIONS 1-9: 4

## 2022-03-29 ASSESSMENT — ANXIETY QUESTIONNAIRES: GAD7 TOTAL SCORE: 3

## 2022-11-20 ENCOUNTER — HEALTH MAINTENANCE LETTER (OUTPATIENT)
Age: 37
End: 2022-11-20

## 2023-04-15 ENCOUNTER — HEALTH MAINTENANCE LETTER (OUTPATIENT)
Age: 38
End: 2023-04-15

## 2023-06-01 NOTE — TELEPHONE ENCOUNTER
"Routing refill request to provider for review/approval because:  --Last refill order sent 11/12/19 was sumit refill with reminder.  --Reminder letters and voice mail sent 11/12/19 and 12/12/19.  --Last office visit 11/28/18.  --Another refill request came in on 12/7/19 and response from provider was \"Alexus Boss MD     2:04 PM   Note Needs appointment.  Once schedules, okay to fill for 1 month.  Thank you, Alexus Boss MD\"    --No appointment scheduled yet.    Message sent to pharmacy - Refusal reason: Patient needs appointment.  Marce QUINTANA              " Yes

## 2023-09-14 DIAGNOSIS — F41.1 GAD (GENERALIZED ANXIETY DISORDER): ICD-10-CM

## 2023-09-15 NOTE — TELEPHONE ENCOUNTER
Last visit 3/2022, overdue for appt. Needs follow up for medication refill, please schedule with PCP, virtual OK  Cecilia Aguirre PA-C

## 2023-09-18 NOTE — TELEPHONE ENCOUNTER
Spoke with patient and first available visit virtual was not until 10/3/2023 appointment has been scheduled but patient will need a refill to get her to this appointment

## 2023-10-03 ENCOUNTER — VIRTUAL VISIT (OUTPATIENT)
Dept: FAMILY MEDICINE | Facility: CLINIC | Age: 38
End: 2023-10-03

## 2023-10-03 DIAGNOSIS — F41.1 GAD (GENERALIZED ANXIETY DISORDER): ICD-10-CM

## 2023-10-03 PROCEDURE — 99213 OFFICE O/P EST LOW 20 MIN: CPT | Mod: 95 | Performed by: FAMILY MEDICINE

## 2023-10-03 ASSESSMENT — ANXIETY QUESTIONNAIRES
5. BEING SO RESTLESS THAT IT IS HARD TO SIT STILL: NOT AT ALL
4. TROUBLE RELAXING: SEVERAL DAYS
IF YOU CHECKED OFF ANY PROBLEMS ON THIS QUESTIONNAIRE, HOW DIFFICULT HAVE THESE PROBLEMS MADE IT FOR YOU TO DO YOUR WORK, TAKE CARE OF THINGS AT HOME, OR GET ALONG WITH OTHER PEOPLE: NOT DIFFICULT AT ALL
2. NOT BEING ABLE TO STOP OR CONTROL WORRYING: SEVERAL DAYS
6. BECOMING EASILY ANNOYED OR IRRITABLE: SEVERAL DAYS
GAD7 TOTAL SCORE: 4
GAD7 TOTAL SCORE: 4
1. FEELING NERVOUS, ANXIOUS, OR ON EDGE: SEVERAL DAYS
3. WORRYING TOO MUCH ABOUT DIFFERENT THINGS: NOT AT ALL
7. FEELING AFRAID AS IF SOMETHING AWFUL MIGHT HAPPEN: NOT AT ALL

## 2023-10-03 ASSESSMENT — PATIENT HEALTH QUESTIONNAIRE - PHQ9
10. IF YOU CHECKED OFF ANY PROBLEMS, HOW DIFFICULT HAVE THESE PROBLEMS MADE IT FOR YOU TO DO YOUR WORK, TAKE CARE OF THINGS AT HOME, OR GET ALONG WITH OTHER PEOPLE: NOT DIFFICULT AT ALL
SUM OF ALL RESPONSES TO PHQ QUESTIONS 1-9: 3
SUM OF ALL RESPONSES TO PHQ QUESTIONS 1-9: 3

## 2023-10-03 NOTE — COMMUNITY RESOURCES LIST (ENGLISH)
10/03/2023   [facility] - Outpatient Clinics  N/A  For additional resource needs, please contact your health insurance member services or your primary care team.  Phone: [phone]   Email: N/A   Address: [address] [place] [address]   Hours: N/A        Financial Stability       Rent and mortgage payment assistance  1  [facility] - Outpatient Addiction Treatment Distance: 0.94 miles      In-Person, Phone/Virtual   [address] [facility] [address] [place] [address]  Language: English, Hebrew  Hours: Mon - Fri 8:00 AM - 4:30 PM  Fees: Insurance   Phone: (603) 230-4989 Email: [email or web address]@[email or web address] Website: [email or web address]://[employer]/roots/     2  [facility] [employer] - Multifamily Rental Assistance Program Distance: 3.43 miles      Phone/Virtual   [address] [facility] [address] [place] [address]  Language: English  Hours: Mon - Fri 8:00 AM - 5:00 PM Appt. Only  Fees: Free   Phone: (430) 365-6138 Email: [place].My-Apps@[facility]. Website: https://www.Coin[employer].gov/index.html          Important Numbers & Websites       [place] [address] [address].org  Poison Control   (910) 380-3382 [place]poison.[place]  Suicide and Crisis Lifeline   [address] [facility].org  Childhelp National Child Abuse Hotline   409.408.6286 Childhelphotline.org  National Sexual Assault Hotline   (151) 461-3236 (HOPE) Rainn.org  National Runaway Safeline   (513) 318-8986 (RUNAWAY) [facility] Cent[facility]una[facility].org  Pregnancy & Postpartum Support [place]   Call/text 967-497-3936 Ppsupport[place].org  Substance Abuse National Helpline ([facility]   805-828-HELP (1624) Findtreatment.gov  Emergency Services   911

## 2023-10-03 NOTE — COMMUNITY RESOURCES LIST (ENGLISH)
10/03/2023   [facility] - Outpatient Clinics  N/A  For additional resource needs, please contact your health insurance member services or your primary care team.  Phone: [phone]   Email: N/A   Address: [address] [place] [address]   Hours: N/A        Financial Stability       Rent and mortgage payment assistance  1  [facility] Distance: 0.94 miles      In-Person, Phone/Virtual   [address] [facility] [address] [place] [address]  Language: English, Albanian  Hours: Mon - Fri 8:00 AM - 4:30 PM  Fees: Insurance   Phone: (175) 522-8836 Email: sebastian@[employer] Website: https://[employer]/roots/     2  [facility] - Multifamily Rental Assistance Program Distance: 3.43 miles      Phone/Virtual   [address] [facility] [address] [place] [address]  Language: English  Hours: Mon - Fri 8:00 AM - 5:00 PM Appt. Only  Fees: Free   Phone: (739) 872-1818 Email: m[place].[employer]@[facility]. Website: https://www.Denator.gov/index.html          Important Numbers & Websites       [place] [address] 211unitedway.org  Poison Control   (878) 557-8056 [place]poison.[place]  Suicide and Crisis Lifeline   988 98[facility] Center[facility].org  Childhelp National Child Abuse Hotline   844.193.2063 Childhelphotline.org  National Sexual Assault Hotline   (634) 159-4097 (HOPE) Rainn.org  National Runaway Safeline   (946) 137-3712 (RUNAWAY) [facility].org  Pregnancy & Postpartum Support [place]   Call/text 794-275-2392 Ppsupport[place].org  Substance Abuse National Helpline ([facility]   347-549-HELP (2758) Findtreatment.gov  Emergency Services   911

## 2023-10-03 NOTE — PROGRESS NOTES
"Ra is a 37 year old who is being evaluated via a billable telephone visit.        Distant Location (provider location):  On-site    Assessment & Plan     LAVINIA (generalized anxiety disorder)  -Stable on sertraline, no side effects  - sertraline (ZOLOFT) 50 MG tablet  Dispense: 90 tablet; Refill: 1  -Follow-up 11/2023 as scheduled for annual preventive health visit                   DO ARUN Solares [facility]    Katie Knapp is a 37 year old, presenting for the following health issues:  Refill Request      10/3/2023     4:03 PM   Additional Questions   Roomed by Trina Lockhart of anxiety.  Takes sertraline 50  mg daily. Works well to manage symptoms. No side effects on medicaiton.            Review of Systems         Objective    Vitals - Patient Reported  Weight (Patient Reported): 68 kg (150 lb)  Height (Patient Reported): 172.7 cm (5' 8\")  BMI (Based on Pt Reported Ht/Wt): 22.81  Pain Score: No Pain (0)        Physical Exam   healthy, alert, and no distress  PSYCH: Alert and oriented times 3; coherent speech, normal   rate and volume, able to articulate logical thoughts, able   to abstract reason, no tangential thoughts, no hallucinations   or delusions  His affect is normal  RESP: No cough, no audible wheezing, able to talk in full sentences  Remainder of exam unable to be completed due to telephone visits                Phone call duration: 5 minutes        "

## 2023-10-03 NOTE — COMMUNITY RESOURCES LIST (ENGLISH)
10/03/2023   [facility] Outpatient Clinics  N/A  For additional resource needs, please contact your health insurance member services or your primary care team.  Phone: [phone]   Email: N/A   Address: [address] [place] [address]   Hours: N/A        Financial Stability       Rent and mortgage payment assistance  1  [facility] Distance: 0.94 miles      In-Person, Phone/Virtual   393 [facility] [address] [place] [address]  Language: English, Sinhala  Hours: Mon - Fri 8:00 AM - 4:30 PM  Fees: Insurance   Phone: (531) 832-1452 Email: [email or web address]@[email or web address] Website: https://[employer]/roots/     2  [facility] [employer] - Multifamily Rental Assistance Program Distance: 3.43 miles      Phone/Virtual   400 [facility] 400 [place] [address]  Language: English  Hours: Mon - Fri 8:00 AM - 5:00 PM Appt. Only  Fees: Free   Phone: (282) 425-3835 Email: mn.Think Realtime@[facility]. Website: https://www.OopsLab.gov/index.html          Important Numbers & Websites       [place] [address] 211unitedway.org  Poison Control   (668) 979-5609 [place]poison.org  Suicide and Crisis Lifeline   988 98[facility] Systemline.org  Childhelp National Child Abuse Hotline   512.445.5701 Childhelphotline.org  National Sexual Assault Hotline   (811) 377-2772 (HOPE) Rainn.org  National Runaway Safeline   (564) 909-9206 (RUNAWAY) [facility] Clinicsrunaway.org  Pregnancy & Postpartum Support [place]   Call/text 182-623-8952 Ppsupportmn.org  Substance Abuse National Helpline ([facility]   498-418-HELP (7595) Findtreatment.gov  Emergency Services   911

## 2023-10-03 NOTE — COMMUNITY RESOURCES LIST (ENGLISH)
10/03/2023   [facility] - Outpatient Clinics  N/A  For additional resource needs, please contact your health insurance member services or your primary care team.  Phone: [phone]   Email: N/A   Address: [address] [place] [address]   Hours: N/A        Financial Stability       Rent and mortgage payment assistance  1  [facility] Distance: 0.94 miles      In-Person, Phone/Virtual   393 [facility] 300 [place] [address]  Language: English, Italian  Hours: Mon - Fri 8:00 AM - 4:30 PM  Fees: Insurance   Phone: (176) 460-7595 Email: [email or web address] Website: https://[employer]/roots/     2  [facility] Finance Agency - Multifamily Rental Assistance Program Distance: 3.43 miles      Phone/Virtual   400 [facility] 400 [place] [address]  Language: English  Hours: Mon - Fri 8:00 AM - 5:00 PM Appt. Only  Fees: Free   Phone: (216) 893-8985 Email: mn.Odd Geology@[facility]. Website: https://www.InnSania.gov/index.html          Important Numbers & Websites       [place] [address] 211unitedway.org  Poison Control   (547) 134-8876 [place]poison.org  Suicide and Crisis Lifeline   988 98[facility].org  Childhelp National Child Abuse Hotline   717.931.1697 Childhelphotline.org  National Sexual Assault Hotline   (837) 490-5923 (HOPE) Rainn.org  National Runaway Safeline   (480) 802-1163 (RUNAWAY) [facility]icsrunaway.org  Pregnancy & Postpartum Support [place]   Call/text 836-572-4822 Ppsupportmn.org  Substance Abuse National Helpline ([facility]   590-651-HELP (3469) Findtreatment.gov  Emergency Services   911

## 2023-10-03 NOTE — COMMUNITY RESOURCES LIST (ENGLISH)
10/03/2023   [facility] - Outpatient Clinics  N/A  For additional resource needs, please contact your health insurance member services or your primary care team.  Phone: [phone]   Email: N/A   Address: [address] [place] [address]   Hours: N/A        Financial Stability       Rent and mortgage payment assistance  1  [facility] Distance: 0.94 miles      In-Person, Phone/Virtual   393 [facility] 300 [place] [address]  Language: English, Ukrainian  Hours: Mon - Fri 8:00 AM - 4:30 PM  Fees: Insurance   Phone: (646) 313-3441 Email: [email or web address] Website: https://[employer]/roots/     2  [facility] [employer] - Multifamily Rental Assistance Program Distance: 3.43 miles      Phone/Virtual   400 [facility] 400 [place] [address]  Language: English  Hours: Mon - Fri 8:00 AM - 5:00 PM Appt. Only  Fees: Free   Phone: (746) 285-5581 Email: mn.SocialSci@[facility]. Website: https://www.[employer].gov/index.html          Important Numbers & Websites       [place] [address] 211unitedway.org  Poison Control   (209) 234-4813 [place]poison.org  Suicide and Crisis Lifeline   988 98[facility]line.org  Childhelp National Child Abuse Hotline   499.117.2545 Childhelphotline.org  National Sexual Assault Hotline   (491) 428-7998 (HOPE) Rainn.org  National Runaway Safeline   (597) 644-8270 (RUNAWAY) [facility] Cent[facility]unaway.org  Pregnancy & Postpartum Support [place]   Call/text 471-676-4117 Ppsupportmn.org  Substance Abuse National Helpline ([facility]   810-373-HELP (8400) Findtreatment.gov  Emergency Services   911

## 2023-10-03 NOTE — COMMUNITY RESOURCES LIST (ENGLISH)
10/03/2023   [facility]  N/A  For questions about this resource list or additional care needs, please contact your primary care clinic or care manager.  Phone: [phone]   Email: N/A   Address: [address] [place] [address]   Hours: N/A        Financial Stability       Rent and mortgage payment assistance  1  [facility] Distance: 0.94 miles      In-Person, Phone/Virtual   393 [facility] 300 [place] [address]  Language: English, Turkish  Hours: Mon - Fri 8:00 AM - 4:30 PM  Fees: Insurance   Phone: (834) 387-5657 Email: roots@[employer] Website: https://[employer]/roots/     2  [facility] [place] - Multifamily Rental Assistance Program Distance: 3.43 miles      Phone/Virtual   400 [facility] 400 [place] [address]  Language: English  Hours: Mon - Fri 8:00 AM - 5:00 PM Appt. Only  Fees: Free   Phone: (866) 224-6963 Email: mn.Mentor Me@[facility]. Website: https://www.DadShed.gov/index.html          Important Numbers & Websites       Emergency Services   911  [facility] Services   311  Poison Control   (579) 942-2825  Suicide Prevention Lifeline   (290) 295-5029 (TALK)  Child Abuse Hotline   (557) 209-7167 (4-A-Child)  Sexual Assault Hotline   (815) 406-9263 (HOPE)  National Runaway Safeline   (780) 236-5147 (RUNAWAY)  All-Options Talkline   (207) 245-6714  Substance Abuse Referral   (557) 224-5843 (HELP)

## 2023-10-03 NOTE — COMMUNITY RESOURCES LIST (ENGLISH)
10/03/2023   [facility] - Outpatient Clinics  N/A  For additional resource needs, please contact your health insurance member services or your primary care team.  Phone: [phone]   Email: N/A   Address: [address] [place] [address]   Hours: N/A        Financial Stability       Rent and mortgage payment assistance  1  [facility] Distance: 0.94 miles      In-Person, Phone/Virtual   393 [facility] 300 [place] [address]  Language: English, Persian  Hours: Mon - Fri 8:00 AM - 4:30 PM  Fees: Insurance   Phone: (596) 395-8014 Email: [email or web address] Website: https://[email or web address]/roots/     2  [facility] Finance Agency - Multifamily Rental Assistance Program Distance: 3.43 miles      Phone/Virtual   400 [facility] 400 [place] [address]  Language: English  Hours: Mon - Fri 8:00 AM - 5:00 PM Appt. Only  Fees: Free   Phone: (524) 616-6124 Email: mn.BCR Environmental@[facility]. Website: https://www.Lightning Lab.gov/index.html          Important Numbers & Websites       [place] [address] 211unitedway.org  Poison Control   (464) 296-5345 [place]poison.org  Suicide and Crisis Lifeline   988 98[facility] Hospitalline.org  Childhelp National Child Abuse Hotline   855.496.1839 Childhelphotline.org  National Sexual Assault Hotline   (709) 274-5058 (HOPE) Rainn.org  National Runaway Safeline   (487) 710-9204 (RUNAWAY) [facility]away.org  Pregnancy & Postpartum Support [place]   Call/text 228-477-9184 Ppsupportmn.org  Substance Abuse National Helpline ([facility]   293-035-HELP (2383) Findtreatment.gov  Emergency Services   911

## 2024-03-12 ENCOUNTER — OFFICE VISIT (OUTPATIENT)
Dept: FAMILY MEDICINE | Facility: CLINIC | Age: 39
End: 2024-03-12

## 2024-03-12 VITALS
HEIGHT: 68 IN | SYSTOLIC BLOOD PRESSURE: 136 MMHG | HEART RATE: 90 BPM | DIASTOLIC BLOOD PRESSURE: 80 MMHG | RESPIRATION RATE: 22 BRPM | WEIGHT: 177.6 LBS | TEMPERATURE: 97.9 F | OXYGEN SATURATION: 98 % | BODY MASS INDEX: 26.92 KG/M2

## 2024-03-12 DIAGNOSIS — F41.1 GAD (GENERALIZED ANXIETY DISORDER): ICD-10-CM

## 2024-03-12 DIAGNOSIS — Z13.1 SCREENING FOR DIABETES MELLITUS: ICD-10-CM

## 2024-03-12 DIAGNOSIS — Z13.220 LIPID SCREENING: ICD-10-CM

## 2024-03-12 DIAGNOSIS — Z23 ENCOUNTER FOR VACCINATION: ICD-10-CM

## 2024-03-12 DIAGNOSIS — Z11.4 SCREENING FOR HIV (HUMAN IMMUNODEFICIENCY VIRUS): ICD-10-CM

## 2024-03-12 DIAGNOSIS — Z00.00 ROUTINE GENERAL MEDICAL EXAMINATION AT A HEALTH CARE FACILITY: Primary | ICD-10-CM

## 2024-03-12 DIAGNOSIS — Z11.59 NEED FOR HEPATITIS C SCREENING TEST: ICD-10-CM

## 2024-03-12 LAB — HBA1C MFR BLD: 5.2 % (ref 0–5.6)

## 2024-03-12 PROCEDURE — 80061 LIPID PANEL: CPT | Performed by: FAMILY MEDICINE

## 2024-03-12 PROCEDURE — 90471 IMMUNIZATION ADMIN: CPT | Performed by: FAMILY MEDICINE

## 2024-03-12 PROCEDURE — 86803 HEPATITIS C AB TEST: CPT | Performed by: FAMILY MEDICINE

## 2024-03-12 PROCEDURE — 36415 COLL VENOUS BLD VENIPUNCTURE: CPT | Performed by: FAMILY MEDICINE

## 2024-03-12 PROCEDURE — 90715 TDAP VACCINE 7 YRS/> IM: CPT | Performed by: FAMILY MEDICINE

## 2024-03-12 PROCEDURE — 99213 OFFICE O/P EST LOW 20 MIN: CPT | Mod: 25 | Performed by: FAMILY MEDICINE

## 2024-03-12 PROCEDURE — 99395 PREV VISIT EST AGE 18-39: CPT | Mod: 25 | Performed by: FAMILY MEDICINE

## 2024-03-12 PROCEDURE — 87389 HIV-1 AG W/HIV-1&-2 AB AG IA: CPT | Performed by: FAMILY MEDICINE

## 2024-03-12 PROCEDURE — 83036 HEMOGLOBIN GLYCOSYLATED A1C: CPT | Performed by: FAMILY MEDICINE

## 2024-03-12 SDOH — HEALTH STABILITY: PHYSICAL HEALTH: ON AVERAGE, HOW MANY MINUTES DO YOU ENGAGE IN EXERCISE AT THIS LEVEL?: 60 MIN

## 2024-03-12 SDOH — HEALTH STABILITY: PHYSICAL HEALTH: ON AVERAGE, HOW MANY DAYS PER WEEK DO YOU ENGAGE IN MODERATE TO STRENUOUS EXERCISE (LIKE A BRISK WALK)?: 3 DAYS

## 2024-03-12 ASSESSMENT — ANXIETY QUESTIONNAIRES
1. FEELING NERVOUS, ANXIOUS, OR ON EDGE: NOT AT ALL
7. FEELING AFRAID AS IF SOMETHING AWFUL MIGHT HAPPEN: NOT AT ALL
GAD7 TOTAL SCORE: 2
3. WORRYING TOO MUCH ABOUT DIFFERENT THINGS: NOT AT ALL
7. FEELING AFRAID AS IF SOMETHING AWFUL MIGHT HAPPEN: NOT AT ALL
5. BEING SO RESTLESS THAT IT IS HARD TO SIT STILL: NOT AT ALL
GAD7 TOTAL SCORE: 2
IF YOU CHECKED OFF ANY PROBLEMS ON THIS QUESTIONNAIRE, HOW DIFFICULT HAVE THESE PROBLEMS MADE IT FOR YOU TO DO YOUR WORK, TAKE CARE OF THINGS AT HOME, OR GET ALONG WITH OTHER PEOPLE: NOT DIFFICULT AT ALL
6. BECOMING EASILY ANNOYED OR IRRITABLE: SEVERAL DAYS
8. IF YOU CHECKED OFF ANY PROBLEMS, HOW DIFFICULT HAVE THESE MADE IT FOR YOU TO DO YOUR WORK, TAKE CARE OF THINGS AT HOME, OR GET ALONG WITH OTHER PEOPLE?: NOT DIFFICULT AT ALL
GAD7 TOTAL SCORE: 2
2. NOT BEING ABLE TO STOP OR CONTROL WORRYING: NOT AT ALL
4. TROUBLE RELAXING: SEVERAL DAYS

## 2024-03-12 ASSESSMENT — PATIENT HEALTH QUESTIONNAIRE - PHQ9
SUM OF ALL RESPONSES TO PHQ QUESTIONS 1-9: 2
10. IF YOU CHECKED OFF ANY PROBLEMS, HOW DIFFICULT HAVE THESE PROBLEMS MADE IT FOR YOU TO DO YOUR WORK, TAKE CARE OF THINGS AT HOME, OR GET ALONG WITH OTHER PEOPLE: NOT DIFFICULT AT ALL
SUM OF ALL RESPONSES TO PHQ QUESTIONS 1-9: 2

## 2024-03-12 ASSESSMENT — PAIN SCALES - GENERAL: PAINLEVEL: MILD PAIN (3)

## 2024-03-12 ASSESSMENT — SOCIAL DETERMINANTS OF HEALTH (SDOH): HOW OFTEN DO YOU GET TOGETHER WITH FRIENDS OR RELATIVES?: ONCE A WEEK

## 2024-03-12 NOTE — PROGRESS NOTES
"Preventive Care Visit  [facility]  Tom Joseph DO, Family Medicine  Mar 12, 2024    Assessment & Plan     Routine general medical examination at a health care facility    Lipid screening  - Lipid panel reflex to direct LDL Non-fasting    Screening for diabetes mellitus  - Hemoglobin A1c    Screening for HIV (human immunodeficiency virus)  - HIV Antigen Antibody Combo    Need for hepatitis C screening test  - Hepatitis C Screen Reflex to HCV RNA Quant and Genotype    Encounter for vaccination  - TDAP 10-64Y (ADACEL,BOOSTRIX)    LAVINIA (generalized anxiety disorder)  -Stable with medication  - sertraline (ZOLOFT) 50 MG tablet  Dispense: 90 tablet; Refill: 3                BMI  Estimated body mass index is 27 kg/m  as calculated from the following:    Height as of this encounter: 1.727 m (5' 8\").    Weight as of this encounter: 80.6 kg (177 lb 9.6 oz).       Counseling  Appropriate preventive services were discussed with this patient, including applicable screening as appropriate for fall prevention, nutrition, physical activity, Tobacco-use cessation, weight loss and cognition.  Checklist reviewing preventive services available has been given to the patient.  Reviewed patient's diet, addressing concerns and/or questions.   He is at risk for lack of exercise and has been provided with information to increase physical activity for the benefit of his well-being.           aKtie Knapp is a 38 year old, presenting for the following:  Physical (/) and Rx Recheck (/Sertraline (ZOLOFT) 50 MG tablet)        3/12/2024     3:36 PM   Additional Questions   Roomed by Anali Wagoner. MA   Accompanied by JYOTI        Health Care Directive  Patient does not have a Health Care Directive or Living Will: Discussed advance care planning with patient; information given to patient to review.    HPI    Other concerns:  Takes sertraline 50 mg daily for anxiety, continues to work well. No side effects. "               3/12/2024   General Health   How would you rate your overall physical health? Good   Feel stress (tense, anxious, or unable to sleep) Not at all         3/12/2024   Nutrition   Three or more servings of calcium each day? Yes   Diet: Regular (no restrictions)   How many servings of fruit and vegetables per day? (!) 2-3   How many sweetened beverages each day? 0-1         3/12/2024   Exercise   Days per week of moderate/strenous exercise 3 days   Average minutes spent exercising at this level 60 min         3/12/2024   Social Factors   Frequency of gathering with friends or relatives Once a week   Worry food won't last until get money to buy more Yes   Food not last or not have enough money for food? Yes   Do you have housing?  Yes   Are you worried about losing your housing? No   Lack of transportation? No   Unable to get utilities (heat,electricity)? No   (!) FOOD SECURITY CONCERN PRESENT      3/12/2024   Dental   Dentist two times every year? Yes         3/12/2024   TB Screening   Were you born outside of [place]?  No       Today's PHQ-9 Score:       3/12/2024    12:00 PM   PHQ-9 SCORE   PHQ-9 Total Score MyChart 2 (Minimal depression)   PHQ-9 Total Score 2         3/12/2024   Substance Use   Alcohol more than 3/day or more than 7/wk No   Do you use any other substances recreationally? (!) ALCOHOL     Social History     Tobacco Use    Smoking status: Former     Types: Cigarettes    Smokeless tobacco: Never   Vaping Use    Vaping Use: Never used   Substance Use Topics    Alcohol use: Yes     Comment: 1-2 times a week     Drug use: No             3/12/2024   One time HIV Screening   Previous HIV test? No         3/12/2024   STI Screening   New sexual partner(s) since last STI/HIV test? No         3/12/2024   Contraception/Family Planning   Questions about contraception or family planning No        Reviewed and updated as needed this visit by Provider                             Objective    Exam  /80  "(BP Location: Left arm, Patient Position: Sitting, Cuff Size: Adult Regular)   Pulse 90   Temp 97.9  F (36.6  C) (Tympanic)   Resp 22   Ht 1.727 m (5' 8\")   Wt 80.6 kg (177 lb 9.6 oz)   SpO2 98%   BMI 27.00 kg/m     Estimated body mass index is 27 kg/m  as calculated from the following:    Height as of this encounter: 1.727 m (5' 8\").    Weight as of this encounter: 80.6 kg (177 lb 9.6 oz).    Physical Exam  GENERAL: alert and no distress  EYES: Eyes grossly normal to inspection, PERRL and conjunctivae and sclerae normal  HENT: nose and mouth without ulcers or lesions  NECK: no adenopathy, no asymmetry, masses, or scars  RESP: lungs clear to auscultation - no rales, rhonchi or wheezes  CV: regular rate and rhythm, normal S1 S2, no S3 or S4, no murmur, click or rub, no peripheral edema  ABDOMEN: soft, nontender, no hepatosplenomegaly, no masses and bowel sounds normal  MS: no gross musculoskeletal defects noted, no edema  SKIN: no suspicious lesions or rashes  NEURO: Normal strength and tone, mentation intact and speech normal  PSYCH: mentation appears normal, affect normal/bright      Signed Electronically by: Tom Joseph DO    "

## 2024-03-12 NOTE — PATIENT INSTRUCTIONS
Preventive Care Advice   This is general advice given by our system to help you stay healthy. However, your care team may have specific advice just for you. Please talk to your care team about your preventive care needs.  Nutrition  Eat 5 or more servings of fruits and vegetables each day.  Try wheat bread, brown rice and whole grain pasta (instead of white bread, rice, and pasta).  Get enough calcium and vitamin D. Check the label on foods and aim for 100% of the RDA (recommended daily allowance).  Lifestyle  Exercise at least 150 minutes each week   (30 minutes a day, 5 days a week).  Do muscle strengthening activities 2 days a week. These help control your weight and prevent disease.  No smoking.  Wear sunscreen to prevent skin cancer.  Have a dental exam and cleaning every 6 months.  Yearly exams  See your health care team every year to talk about:  Any changes in your health.  Any medicines your care team has prescribed.  Preventive care, family planning, and ways to prevent chronic diseases.  Shots (vaccines)   HPV shots (up to age 26), if you've never had them before.  Hepatitis B shots (up to age 59), if you've never had them before.  COVID-19 shot: Get this shot when it's due.  Flu shot: Get a flu shot every year.  Tetanus shot: Get a tetanus shot every 10 years.  Pneumococcal, hepatitis A, and RSV shots: Ask your care team if you need these based on your risk.  Shingles shot (for age 50 and up).  General health tests  Diabetes screening:  Starting at age 35, Get screened for diabetes at least every 3 years.  If you are younger than age 35, ask your care team if you should be screened for diabetes.  Cholesterol test: At age 39, start having a cholesterol test every 5 years, or more often if advised.  Bone density scan (DEXA): At age 50, ask your care team if you should have this scan for osteoporosis (brittle bones).  Hepatitis C: Get tested at least once in your life.  STIs (sexually transmitted  infections)  Before age 24: Ask your care team if you should be screened for STIs.  After age 24: Get screened for STIs if you're at risk. You are at risk for STIs (including HIV) if:  You are sexually active with more than one person.  You don't use condoms every time.  You or a partner was diagnosed with a sexually transmitted infection.  If you are at risk for HIV, ask about PrEP medicine to prevent HIV.  Get tested for HIV at least once in your life, whether you are at risk for HIV or not.  Cancer screening tests  Cervical cancer screening: If you have a cervix, begin getting regular cervical cancer screening tests at age 21. Most people who have regular screenings with normal results can stop after age 65. Talk about this with your provider.  Breast cancer scan (mammogram): If you've ever had breasts, begin having regular mammograms starting at age 40. This is a scan to check for breast cancer.  Colon cancer screening: It is important to start screening for colon cancer at age 45.  Have a colonoscopy test every 10 years (or more often if you're at risk) Or, ask your provider about stool tests like a FIT test every year or Cologuard test every 3 years.  To learn more about your testing options, visit: https://www.ObjectWay/350704.pdf.  For help making a decision, visit: https://bit.ly/jz08338.  Prostate cancer screening test: If you have a prostate and are age 55 to 69, ask your provider if you would benefit from a yearly prostate cancer screening test.  Lung cancer screening: If you are a current or former smoker age 50 to 80, ask your care team if ongoing lung cancer screenings are right for you.  For informational purposes only. Not to replace the advice of your health care provider. Copyright   2023 [employer]. All rights reserved. Clinically reviewed by the [facility]. [employer] 541705 - REV 01/24.

## 2024-03-12 NOTE — NURSING NOTE
Prior to immunization administration, verified patients identity using patient s name and date of birth. Please see Immunization Activity for additional information.     Screening Questionnaire for Adult Immunization    Are you sick today?   No   Do you have allergies to medications, food, a vaccine component or latex?   No   Have you ever had a serious reaction after receiving a vaccination?   No   Do you have a long-term health problem with heart, lung, kidney, or metabolic disease (e.g., diabetes), asthma, a blood disorder, no spleen, complement component deficiency, a cochlear implant, or a spinal fluid leak?  Are you on long-term aspirin therapy?   No   Do you have cancer, leukemia, HIV/AIDS, or any other immune system problem?   No   Do you have a parent, brother, or sister with an immune system problem?   No   In the past 3 months, have you taken medications that affect  your immune system, such as prednisone, other steroids, or anticancer drugs; drugs for the treatment of rheumatoid arthritis, Crohn s disease, or psoriasis; or have you had radiation treatments?   No   Have you had a seizure, or a brain or other nervous system problem?   No   During the past year, have you received a transfusion of blood or blood    products, or been given immune (gamma) globulin or antiviral drug?   No   For women: Are you pregnant or is there a chance you could become       pregnant during the next month?   No   Have you received any vaccinations in the past 4 weeks?   No     Immunization questionnaire answers were all negative.      Patient instructed to remain in clinic for 15 minutes afterwards, and to report any adverse reactions.     Screening performed by Annette Christy MA on 3/12/2024 at 4:04 PM.

## 2024-03-12 NOTE — COMMUNITY RESOURCES LIST (ENGLISH)
03/12/2024   [facility]  N/A  For questions about this resource list or additional care needs, please contact your primary care clinic or care manager.  Phone: [phone]   Email: N/A   Address: [address] [place] [address]   Hours: N/A        Food and Nutrition       Food pantry  1  [facility] Distance: 0.86 miles      Pickup   [address] [place] [address]  Language: English  Hours: Mon - Fri 12:00 PM - 1:00 PM  Fees: Free   Phone: (657) 641-8848 Email: info@[facility].[employer] Website: https://www.[facility].viaCycle/locations/_[place]_ymca     2  [facility] Food Shelf Distance: 1.05 miles      In-Person, Delivery   [address] [place] [address]  Language: English, Uzbek  Hours: Mon - Fri 10:00 AM - 12:00 PM , Mon - Tue 1:30 PM - 3:30 PM , Wed 5:00 PM - 7:00 PM , Thu - Fri 1:30 PM - 3:30 PM  Fees: Free   Phone: (859) 382-2744 Email: info@[employer] Website: https://[employer].viaCycle/food-shelves/     SNAP application assistance  3  [employer] ([facility]) [facility] Distance: 0.9 miles      Phone/Virtual   [address] [address]  Language: English  Hours: Mon - Fri 8:00 AM - 4:30 PM  Fees: Free   Phone: (137) 889-1383 Email: info@caprw.org Website: http://www.caprw.org/     4  [facility] - SNAP Ed - SNAP Ed - SNAP application assistance Distance: 2.8 miles      In-Person   [address] [place] [address]  Language: English, Hmong, Oromo, Sammarinese, Uzbek  Hours: Mon - Fri 9:00 AM - 5:00 PM Appt. Only  Fees: Free   Phone: (668) 102-9282 Email: chungt@[facility] Website: https://extension.[facility]/nutrition-education/snap-ed-educational-offerings     Soup kitchen or free meals  5  [employer] Distance: 0.61 miles      [address] [address]  Language: English  Hours: Mon 12:00 PM - 2:00 PM ,  Wed 12:00 PM - 2:00 PM  Fees: Free   Phone: (849) 487-3474 Ext.4 Email: info@[employer] Website: http://www.[employer]     6  [employer] [place] [facility] - Free Summer Meals Distance: 1.03 miles      In-Person   [address] [place] [address]  Language: English  Hours: Mon - Fri 12:00 PM - 1:00 PM , Mon - Fri 3:00 PM - 4:00 PM  Fees: Free   Phone: (648) 308-4107 Email: lindsay@.[facility]. Website: https://www.[facility]/departments/porter-recreation/[place]-[facility]          Important Numbers & Websites       Emergency Services   911  [facility] Services   311  Poison Control   (328) 436-5654  Suicide Prevention Lifeline   (681) 126-9051 (TALK)  Child Abuse Hotline   (723) 727-7354 (4-A-Child)  Sexual Assault Hotline   (529) 363-2648 (HOPE)  National Runaway Safeline   (726) 229-6019 (RUNAWAY)  All-Options Talkline   (566) 818-7694  Substance Abuse Referral   (262) 531-3568 (HELP)

## 2024-03-13 LAB
CHOLEST SERPL-MCNC: 166 MG/DL
FASTING STATUS PATIENT QL REPORTED: NO
HCV AB SERPL QL IA: NONREACTIVE
HDLC SERPL-MCNC: 31 MG/DL
HIV 1+2 AB+HIV1 P24 AG SERPL QL IA: NONREACTIVE
LDLC SERPL CALC-MCNC: 115 MG/DL
NONHDLC SERPL-MCNC: 135 MG/DL
TRIGL SERPL-MCNC: 100 MG/DL

## 2025-02-05 DIAGNOSIS — F41.1 GAD (GENERALIZED ANXIETY DISORDER): ICD-10-CM

## 2025-04-19 ENCOUNTER — HEALTH MAINTENANCE LETTER (OUTPATIENT)
Age: 40
End: 2025-04-19